# Patient Record
Sex: FEMALE | Race: WHITE | Employment: FULL TIME | URBAN - METROPOLITAN AREA
[De-identification: names, ages, dates, MRNs, and addresses within clinical notes are randomized per-mention and may not be internally consistent; named-entity substitution may affect disease eponyms.]

---

## 2021-07-21 ENCOUNTER — HOSPITAL ENCOUNTER (EMERGENCY)
Age: 36
Discharge: HOME | End: 2021-07-21
Payer: COMMERCIAL

## 2021-07-21 VITALS
SYSTOLIC BLOOD PRESSURE: 93 MMHG | HEART RATE: 70 BPM | DIASTOLIC BLOOD PRESSURE: 68 MMHG | OXYGEN SATURATION: 98 % | WEIGHT: 150 LBS | RESPIRATION RATE: 16 BRPM | TEMPERATURE: 98.2 F

## 2021-07-21 DIAGNOSIS — H61.22 IMPACTED CERUMEN OF LEFT EAR: Primary | ICD-10-CM

## 2021-07-21 PROCEDURE — 69209 REMOVE IMPACTED EAR WAX UNI: CPT

## 2021-07-21 PROCEDURE — 99211 OFF/OP EST MAY X REQ PHY/QHP: CPT

## 2021-07-21 NOTE — ED PROVIDER NOTES
Department of Emergency [facility]  Provider Note  Admit Date/RoomTime: 2021  2:43 PM  Room:   NAME: Queen Aneudy  : 1985  MRN: [id number]     Chief Complaint:  Otalgia (feels like left has too much wax in it both are aching)    History of Present Illness       Queen Aneudy is a 28 y.o. old female with a past medical history of: History reviewed. No pertinent past medical history. ,who presents to urgent care center with complaint of left ear feeling like it is blocked she thinks there is too much wax in it. She said her hearing is decreased on that side also. This has been going on for 5 days. ROS    Pertinent positives and negatives are stated within HPI, all other systems reviewed and are negative. Past Surgical History:  has no past surgical history on file. Social History:  reports that she has never smoked. She does not have any smokeless tobacco history on file. She reports that she does not drink alcohol. Family History: family history is not on file. Allergies: Patient has no known allergies. Physical Exam           ED Triage Vitals   BP Temp Temp src Pulse Resp SpO2 Height Weight   21 1451 21 1451 -- 21 1451 21 1449 21 1451 -- 21 1449   93/68 98.2 °F (36.8 °C)  70 16 98 %  150 lb (68 kg)      Oxygen Saturation Interpretation: Normal.    Constitutional:  Alert, development consistent with age. Ears:  External Ears: Bilateral normal.                 TM's & External Canals:  normal TM and external ear canal right ear and abnormal external canal left ear - ceruminosis impacting canal.  Nose:   There is no abnormalities present  Throat:  Pharynx without injection, exudate, or tonsillar hypertrophy. Airway patient. Neck:  Normal ROM. Supple. Respiratory:  No respiratory distress  CV: Regular rate and rhythm,   Skin:  No rashes, erythema present, unless noted elsewhere.   Lymphatic: No lymphangitis or adenopathy noted. Neurological:  Oriented. Motor functions intact. Lab / Imaging Results   (All laboratory and radiology results have been personally reviewed by myself)  Labs:  No results found for this visit on 07/21/21. Imaging: All Radiology results interpreted by Radiologist unless otherwise noted. No orders to display     ED Course / Medical Decision Making   Medications - No data to display      MDM:   Patient has a cerumen impaction of the left ear. The left ear was irrigated by the LPN and with return of a large amount of cerumen. The right is clear. I did reexamine the ear after it was irrigated and the ear canal is  totally clear the TM is intact. The patient states she feels better. Assessment      1. Impacted cerumen of left ear      Plan   Discharge to home and advised to contact call the referral line to get established with a Dr  [phone]       Patient condition is good    New Medications     New Prescriptions    No medications on file     Electronically signed by GALA Pereira CNP   DD: 7/21/21  **This report was transcribed using voice recognition software. Every effort was made to ensure accuracy; however, inadvertent computerized transcription errors may be present.   END OF ED PROVIDER NOTE     GALA Pereira CNP  07/21/21 [id number]